# Patient Record
Sex: MALE | Race: WHITE | HISPANIC OR LATINO | Employment: OTHER | URBAN - METROPOLITAN AREA
[De-identification: names, ages, dates, MRNs, and addresses within clinical notes are randomized per-mention and may not be internally consistent; named-entity substitution may affect disease eponyms.]

---

## 2019-11-04 ENCOUNTER — OFFICE VISIT (OUTPATIENT)
Dept: CARDIOLOGY | Facility: CLINIC | Age: 79
End: 2019-11-04
Payer: COMMERCIAL

## 2019-11-04 VITALS
OXYGEN SATURATION: 94 % | SYSTOLIC BLOOD PRESSURE: 107 MMHG | HEART RATE: 76 BPM | HEIGHT: 68 IN | BODY MASS INDEX: 36.89 KG/M2 | WEIGHT: 243.38 LBS | DIASTOLIC BLOOD PRESSURE: 62 MMHG

## 2019-11-04 DIAGNOSIS — I25.10 CORONARY ARTERY DISEASE INVOLVING NATIVE CORONARY ARTERY OF NATIVE HEART WITHOUT ANGINA PECTORIS: Primary | ICD-10-CM

## 2019-11-04 DIAGNOSIS — E78.00 PURE HYPERCHOLESTEROLEMIA: ICD-10-CM

## 2019-11-04 DIAGNOSIS — Z95.1 S/P CABG (CORONARY ARTERY BYPASS GRAFT): ICD-10-CM

## 2019-11-04 DIAGNOSIS — Z95.1 HX OF CABG: ICD-10-CM

## 2019-11-04 DIAGNOSIS — I10 ESSENTIAL HYPERTENSION: ICD-10-CM

## 2019-11-04 DIAGNOSIS — I87.2 VENOUS STASIS DERMATITIS OF BOTH LOWER EXTREMITIES: ICD-10-CM

## 2019-11-04 DIAGNOSIS — R60.0 LOCALIZED EDEMA: ICD-10-CM

## 2019-11-04 DIAGNOSIS — I73.9 PAD (PERIPHERAL ARTERY DISEASE): ICD-10-CM

## 2019-11-04 PROCEDURE — 99999 PR PBB SHADOW E&M-NEW PATIENT-LVL III: CPT | Mod: PBBFAC,,, | Performed by: INTERNAL MEDICINE

## 2019-11-04 PROCEDURE — 93000 EKG 12-LEAD: ICD-10-PCS | Mod: S$GLB,,, | Performed by: INTERNAL MEDICINE

## 2019-11-04 PROCEDURE — 99999 PR PBB SHADOW E&M-NEW PATIENT-LVL III: ICD-10-PCS | Mod: PBBFAC,,, | Performed by: INTERNAL MEDICINE

## 2019-11-04 PROCEDURE — 1101F PR PT FALLS ASSESS DOC 0-1 FALLS W/OUT INJ PAST YR: ICD-10-PCS | Mod: CPTII,S$GLB,, | Performed by: INTERNAL MEDICINE

## 2019-11-04 PROCEDURE — 93000 ELECTROCARDIOGRAM COMPLETE: CPT | Mod: S$GLB,,, | Performed by: INTERNAL MEDICINE

## 2019-11-04 PROCEDURE — 99205 PR OFFICE/OUTPT VISIT, NEW, LEVL V, 60-74 MIN: ICD-10-PCS | Mod: 25,S$GLB,, | Performed by: INTERNAL MEDICINE

## 2019-11-04 PROCEDURE — 1101F PT FALLS ASSESS-DOCD LE1/YR: CPT | Mod: CPTII,S$GLB,, | Performed by: INTERNAL MEDICINE

## 2019-11-04 PROCEDURE — 99205 OFFICE O/P NEW HI 60 MIN: CPT | Mod: 25,S$GLB,, | Performed by: INTERNAL MEDICINE

## 2019-11-04 RX ORDER — LISINOPRIL 40 MG/1
40 TABLET ORAL DAILY
COMMUNITY
Start: 2019-08-21

## 2019-11-04 RX ORDER — HYDROCHLOROTHIAZIDE 12.5 MG/1
12.5 CAPSULE ORAL DAILY
COMMUNITY
Start: 2019-10-07

## 2019-11-04 RX ORDER — ISOSORBIDE MONONITRATE 60 MG/1
60 TABLET, EXTENDED RELEASE ORAL DAILY
COMMUNITY
Start: 2019-08-21

## 2019-11-04 RX ORDER — DULOXETIN HYDROCHLORIDE 30 MG/1
CAPSULE, DELAYED RELEASE ORAL
Refills: 5 | COMMUNITY
Start: 2019-10-24

## 2019-11-04 RX ORDER — TAMSULOSIN HYDROCHLORIDE 0.4 MG/1
0.4 CAPSULE ORAL DAILY
COMMUNITY
Start: 2019-10-31

## 2019-11-04 RX ORDER — ATORVASTATIN CALCIUM 40 MG/1
40 TABLET, FILM COATED ORAL DAILY
COMMUNITY
Start: 2019-09-11

## 2019-11-04 RX ORDER — ASPIRIN 81 MG/1
81 TABLET ORAL DAILY
COMMUNITY

## 2019-11-04 RX ORDER — METOPROLOL TARTRATE 50 MG/1
50 TABLET ORAL 2 TIMES DAILY
COMMUNITY
Start: 2019-08-21

## 2019-11-04 RX ORDER — GABAPENTIN 300 MG/1
300 CAPSULE ORAL 3 TIMES DAILY
COMMUNITY
Start: 2019-08-21

## 2019-11-04 RX ORDER — INSULIN LISPRO 100 [IU]/ML
INJECTION, SOLUTION INTRAVENOUS; SUBCUTANEOUS
COMMUNITY

## 2019-11-04 NOTE — PROGRESS NOTES
Subjective:      Patient ID: Ray Menchaca is a 79 y.o. male.    Chief Complaint: Hyperlipidemia    HPI:  Pt had CABG in [place] in .  At the time of the CABG pt was experiencing chest pressure and shortness of breath.    Pt is not currently experiencing any symptoms of chest pain or shortness of breath.    Pt was [facility] from [facility].    Pt is able to walk very short distances and sometimes pt can mow the lawn.      Both knees hurt when walking.    Review of Systems   Cardiovascular: Positive for claudication and leg swelling (Pt has chronic weeping ulcers and edema and redness of both lower extremities). Negative for chest pain, dyspnea on exertion, irregular heartbeat, near-syncope, orthopnea, palpitations and syncope.      Pt states he underwent attempted PTA of right lower extremity both from the right groin and from the right foot and both times the doctor was unsuccessful.    Past Medical History:   Diagnosis Date    Coronary artery disease     Diabetes mellitus     Hyperlipidemia     Hypertension     Sleep apnea     pt does not wear a CPAP mask        Past Surgical History:   Procedure Laterality Date    cataract surgery      COLECTOMY      CORONARY ARTERY BYPASS GRAFT         History reviewed. No pertinent family history.    Social History     Socioeconomic History    Marital status: Unknown     Spouse name: Not on file    Number of children: Not on file    Years of education: Not on file    Highest education level: Not on file   Occupational History    Not on file   Social Needs    Financial resource strain: Not on file    Food insecurity:     Worry: Not on file     Inability: Not on file    Transportation needs:     Medical: Not on file     Non-medical: Not on file   Tobacco Use    Smoking status: Former Smoker     Types: Cigars     Start date: 2000     Last attempt to quit: 2005     Years since quittin.0    Smokeless tobacco:  "Never Used   Substance and Sexual Activity    Alcohol use: Not Currently    Drug use: Not on file    Sexual activity: Not on file   Lifestyle    Physical activity:     Days per week: Not on file     Minutes per session: Not on file    Stress: Not on file   Relationships    Social connections:     Talks on phone: Not on file     Gets together: Not on file     Attends Shinto service: Not on file     Active member of club or organization: Not on file     Attends meetings of clubs or organizations: Not on file     Relationship status: Not on file   Other Topics Concern    Not on file   Social History Narrative    Not on file       Current Outpatient Medications on File Prior to Visit   Medication Sig Dispense Refill    aspirin (ECOTRIN) 81 MG EC tablet Take 81 mg by mouth once daily.      atorvastatin (LIPITOR) 40 MG tablet Take 40 mg by mouth once daily.       DULoxetine (CYMBALTA) 30 MG capsule TAKE 1 CAPSULE(S) EVERY DAY BY ORAL ROUTE.  5    gabapentin (NEURONTIN) 300 MG capsule 300 mg 3 (three) times daily.       hydroCHLOROthiazide (MICROZIDE) 12.5 mg capsule Take 12.5 mg by mouth once daily.       insulin lispro (HUMALOG KWIKPEN INSULIN) 100 unit/mL pen Inject 13 units 3 times a day by subcutaneous route.      isosorbide mononitrate (IMDUR) 60 MG 24 hr tablet Take 60 mg by mouth once daily.       lisinopril (PRINIVIL,ZESTRIL) 40 MG tablet Take 40 mg by mouth once daily.       metoprolol tartrate (LOPRESSOR) 50 MG tablet Take 50 mg by mouth 2 (two) times daily.       tamsulosin (FLOMAX) 0.4 mg Cap 0.4 mg once daily.        No current facility-administered medications on file prior to visit.        Review of patient's allergies indicates:  No Known Allergies  Objective:     Vitals:    11/04/19 1142   BP: 107/62   BP Location: Right arm   Patient Position: Sitting   BP Method: Large (Automatic)   Pulse: 76   SpO2: (!) 94%   Weight: 110.4 kg (243 lb 6.2 oz)   Height: 5' 8" (1.727 m)    "     Physical Exam   Constitutional: He is oriented to person, place, and time. He appears well-developed and well-nourished. No distress.   Eyes: No scleral icterus.   Neck: No JVD present. Carotid bruit is not present.   Cardiovascular: Regular rhythm and normal heart sounds. Exam reveals no gallop and no friction rub.   No murmur heard.  Pulses:       Dorsalis pedis pulses are 0 on the right side, and 0 on the left side.        Posterior tibial pulses are 0 on the right side, and 0 on the left side.   Pulmonary/Chest: Effort normal and breath sounds normal. No respiratory distress.   Abdominal: Soft. He exhibits no shifting dullness, no distension, no pulsatile liver, no fluid wave, no abdominal bruit, no ascites, no pulsatile midline mass and no mass. There is no hepatosplenomegaly. There is no tenderness.       Musculoskeletal: He exhibits edema (one plus edema bilaterally with stasis dermatitis).   Neurological: He is alert and oriented to person, place, and time.   Skin: Skin is warm and dry. He is not diaphoretic.   Erythema and induration of both lower extremities with minor serous drainage bilaterally     Psychiatric: He has a normal mood and affect. His behavior is normal. Judgment and thought content normal.   Vitals reviewed.     ECG: NSR, leftward axis, incomplete LBBB  Assessment:     1. Coronary artery disease involving native coronary artery of native heart without angina pectoris    2. S/P CABG (coronary artery bypass graft)    3. Pure hypercholesterolemia    4. PAD (peripheral artery disease)    5. Essential hypertension    6. Venous stasis dermatitis of both lower extremities    7. Hx of CABG    8. Localized edema      Plan:   Ray was seen today for hyperlipidemia.    Diagnoses and all orders for this visit:    Coronary artery disease involving native coronary artery of native heart without angina pectoris    S/P CABG (coronary artery bypass graft)  -     IN OFFICE EKG 12-LEAD (to Muse)  -      Echo Color Flow Doppler? Yes; Future    Pure hypercholesterolemia  -     CBC auto differential; Future  -     Comprehensive metabolic panel; Future  -     Hemoglobin A1c; Future  -     Brain natriuretic peptide; Future  -     Lipid panel; Future  -     TSH; Future    PAD (peripheral artery disease)  -     Ambulatory consult to Wound Clinic  -     US Lower Extremity Arteries Bilateral; Future  -     US Lower Extremity Veins Bilateral; Future  -     CBC auto differential; Future  -     Comprehensive metabolic panel; Future  -     Hemoglobin A1c; Future  -     Brain natriuretic peptide; Future  -     Lipid panel; Future  -     TSH; Future    Essential hypertension  -     CBC auto differential; Future  -     Comprehensive metabolic panel; Future  -     Hemoglobin A1c; Future  -     Brain natriuretic peptide; Future  -     Lipid panel; Future  -     TSH; Future    Venous stasis dermatitis of both lower extremities  -     Ambulatory consult to Wound Clinic  -     US Lower Extremity Arteries Bilateral; Future  -     US Lower Extremity Veins Bilateral; Future  -     Echo Color Flow Doppler? Yes; Future    Hx of CABG  -     IN OFFICE EKG 12-LEAD (to Muse)  -     CBC auto differential; Future  -     Comprehensive metabolic panel; Future  -     Hemoglobin A1c; Future  -     Brain natriuretic peptide; Future  -     Lipid panel; Future  -     TSH; Future    Localized edema  -     Echo Color Flow Doppler? Yes; Future  -     CBC auto differential; Future  -     Comprehensive metabolic panel; Future  -     Hemoglobin A1c; Future  -     Brain natriuretic peptide; Future  -     Lipid panel; Future  -     TSH; Future     Low carb diet discussed at length and in detail.    Wt loss encouraged    Tylenol is OK for chronic knee pain    Same meds    Will need records from last cardiologist in [place].  Pt apparently had a stress test in [place], [place] about 4 months ago.Pt had peripheral angiogram in [facility] in [facility] in  2014    Need recent records from [facility] where pt recently had colonoscopy    Wound care consult for stasis dermatitis with weeping ulcers.  Pt may benefit from Unna boots.    Venous ultrasound of lower extremities.    Arterial ultrasound of lower extremities.    Echocardiogram    CXR    Check lab    F/u with Dr Lay Sumner with [facility]    Follow up in about 4 weeks (around 12/2/2019). Pt to f/u with Dr Crain who is fluent in [place] and is expert in lower extremity revascularization    Addendum 11/8/19:  Upon further review the ECG appears to show atrial flutter with 3:1 second degree AV block.  Awaiting labs and f/u visit with  in order to safely begin anticoagulation with Eliquis.

## 2019-11-08 ENCOUNTER — TELEPHONE (OUTPATIENT)
Dept: CARDIOLOGY | Facility: CLINIC | Age: 79
End: 2019-11-08

## 2019-11-11 ENCOUNTER — TELEPHONE (OUTPATIENT)
Dept: WOUND CARE | Facility: HOSPITAL | Age: 79
End: 2019-11-11

## 2019-11-25 ENCOUNTER — HOSPITAL ENCOUNTER (OUTPATIENT)
Dept: RADIOLOGY | Facility: HOSPITAL | Age: 79
Discharge: HOME | End: 2019-11-25
Attending: INTERNAL MEDICINE
Payer: MEDICARE

## 2019-11-25 DIAGNOSIS — I87.2 VENOUS STASIS DERMATITIS OF BOTH LOWER EXTREMITIES: ICD-10-CM

## 2019-11-25 DIAGNOSIS — I73.9 PAD (PERIPHERAL ARTERY DISEASE): ICD-10-CM

## 2019-11-25 PROCEDURE — 93925 LOWER EXTREMITY STUDY: CPT | Mod: 26,,, | Performed by: RADIOLOGY

## 2019-11-25 PROCEDURE — 93970 EXTREMITY STUDY: CPT | Mod: 26,,, | Performed by: RADIOLOGY

## 2019-11-25 PROCEDURE — 93925 LOWER EXTREMITY STUDY: CPT | Mod: TC

## 2019-11-25 PROCEDURE — 93925 US LOWER EXTREMITY ARTERIES BILATERAL: ICD-10-PCS | Mod: 26,,, | Performed by: RADIOLOGY

## 2019-11-25 PROCEDURE — 93970 EXTREMITY STUDY: CPT | Mod: TC

## 2019-11-25 PROCEDURE — 93970 US LOWER EXTREMITY VEINS BILATERAL: ICD-10-PCS | Mod: 26,,, | Performed by: RADIOLOGY

## 2019-12-03 ENCOUNTER — HOSPITAL ENCOUNTER (OUTPATIENT)
Dept: CARDIOLOGY | Facility: HOSPITAL | Age: 79
Discharge: HOME | End: 2019-12-03
Attending: INTERNAL MEDICINE
Payer: MEDICARE

## 2019-12-03 VITALS — WEIGHT: 243 LBS | BODY MASS INDEX: 36.83 KG/M2 | HEIGHT: 68 IN

## 2019-12-03 DIAGNOSIS — R60.0 LOCALIZED EDEMA: ICD-10-CM

## 2019-12-03 DIAGNOSIS — Z95.1 S/P CABG (CORONARY ARTERY BYPASS GRAFT): ICD-10-CM

## 2019-12-03 DIAGNOSIS — I87.2 VENOUS STASIS DERMATITIS OF BOTH LOWER EXTREMITIES: ICD-10-CM

## 2019-12-03 LAB
AORTIC ROOT ANNULUS: 2.45 CM
AORTIC VALVE CUSP SEPERATION: 1.33 CM
ASCENDING AORTA: 2.57 CM
AV INDEX (PROSTH): 0.4
AV MEAN GRADIENT: 9 MMHG
AV PEAK GRADIENT: 15 MMHG
AV VALVE AREA: 1.51 CM2
AV VELOCITY RATIO: 0.52
BSA FOR ECHO PROCEDURE: 2.3 M2
CV ECHO LV RWT: 0.5 CM
DOP CALC AO PEAK VEL: 1.92 M/S
DOP CALC AO VTI: 44.75 CM
DOP CALC LVOT AREA: 3.8 CM2
DOP CALC LVOT DIAMETER: 2.19 CM
DOP CALC LVOT PEAK VEL: 1 M/S
DOP CALC LVOT STROKE VOLUME: 67.66 CM3
DOP CALCLVOT PEAK VEL VTI: 17.97 CM
E WAVE DECELERATION TIME: 148.14 MSEC
E/A RATIO: 2.35
ECHO LV POSTERIOR WALL: 1.3 CM (ref 0.6–1.1)
FRACTIONAL SHORTENING: 30 % (ref 28–44)
INTERVENTRICULAR SEPTUM: 1.3 CM (ref 0.6–1.1)
IVRT: 0.04 MSEC
LA MAJOR: 5.81 CM
LA MINOR: 5.69 CM
LA WIDTH: 3.98 CM
LEFT ATRIUM SIZE: 5 CM
LEFT ATRIUM VOLUME INDEX: 43.8 ML/M2
LEFT ATRIUM VOLUME: 97.25 CM3
LEFT INTERNAL DIMENSION IN SYSTOLE: 3.63 CM (ref 2.1–4)
LEFT VENTRICLE DIASTOLIC VOLUME INDEX: 58.35 ML/M2
LEFT VENTRICLE DIASTOLIC VOLUME: 129.56 ML
LEFT VENTRICLE MASS INDEX: 125 G/M2
LEFT VENTRICLE SYSTOLIC VOLUME INDEX: 25 ML/M2
LEFT VENTRICLE SYSTOLIC VOLUME: 55.48 ML
LEFT VENTRICULAR INTERNAL DIMENSION IN DIASTOLE: 5.2 CM (ref 3.5–6)
LEFT VENTRICULAR MASS: 278.44 G
LV LATERAL E/E' RATIO: 9.77 M/S
MV PEAK A VEL: 0.54 M/S
MV PEAK E VEL: 1.27 M/S
PISA TR MAX VEL: 3.07 M/S
RA MAJOR: 5.27 CM
RA PRESSURE: 8 MMHG
RA WIDTH: 4.65 CM
RIGHT VENTRICULAR END-DIASTOLIC DIMENSION: 2.75 CM
STJ: 2.9 CM
TDI LATERAL: 0.13 M/S
TR MAX PG: 38 MMHG
TV REST PULMONARY ARTERY PRESSURE: 46 MMHG

## 2019-12-03 PROCEDURE — 93306 TTE W/DOPPLER COMPLETE: CPT | Mod: 26,,, | Performed by: INTERNAL MEDICINE

## 2019-12-03 PROCEDURE — 93306 TTE W/DOPPLER COMPLETE: CPT

## 2019-12-03 PROCEDURE — 93306 ECHO (CUPID ONLY): ICD-10-PCS | Mod: 26,,, | Performed by: INTERNAL MEDICINE

## 2019-12-03 PROCEDURE — 63600175 PHARM REV CODE 636 W HCPCS: Performed by: INTERNAL MEDICINE

## 2019-12-03 RX ADMIN — HUMAN ALBUMIN MICROSPHERES AND PERFLUTREN 0.55 MG: 10; .22 INJECTION, SOLUTION INTRAVENOUS at 03:12

## 2019-12-11 ENCOUNTER — HOSPITAL ENCOUNTER (OUTPATIENT)
Dept: WOUND CARE | Facility: HOSPITAL | Age: 79
Discharge: HOME | End: 2019-12-11
Attending: INTERNAL MEDICINE
Payer: MEDICARE

## 2019-12-11 VITALS
BODY MASS INDEX: 36.83 KG/M2 | DIASTOLIC BLOOD PRESSURE: 57 MMHG | HEIGHT: 68 IN | TEMPERATURE: 97 F | HEART RATE: 53 BPM | SYSTOLIC BLOOD PRESSURE: 125 MMHG | WEIGHT: 243 LBS

## 2019-12-11 DIAGNOSIS — I10 ESSENTIAL HYPERTENSION: ICD-10-CM

## 2019-12-11 DIAGNOSIS — I73.9 PAD (PERIPHERAL ARTERY DISEASE): ICD-10-CM

## 2019-12-11 DIAGNOSIS — E78.00 PURE HYPERCHOLESTEROLEMIA: ICD-10-CM

## 2019-12-11 DIAGNOSIS — I87.2 VENOUS STASIS DERMATITIS OF BOTH LOWER EXTREMITIES: Primary | ICD-10-CM

## 2019-12-11 DIAGNOSIS — I25.10 CORONARY ARTERY DISEASE INVOLVING NATIVE CORONARY ARTERY OF NATIVE HEART WITHOUT ANGINA PECTORIS: ICD-10-CM

## 2019-12-11 PROCEDURE — 87077 CULTURE AEROBIC IDENTIFY: CPT

## 2019-12-11 PROCEDURE — 99214 OFFICE O/P EST MOD 30 MIN: CPT

## 2019-12-11 PROCEDURE — 87070 CULTURE OTHR SPECIMN AEROBIC: CPT

## 2019-12-11 PROCEDURE — 87186 SC STD MICRODIL/AGAR DIL: CPT

## 2019-12-11 PROCEDURE — 87075 CULTR BACTERIA EXCEPT BLOOD: CPT

## 2019-12-11 NOTE — PROGRESS NOTES
"Subjective:       Patient ID: Ray Menchaca is a 79 y.o. male.    Chief Complaint: Venous Ulcer    12/11/19/:79 yr old Vincentian speaking  male, admit today to wound care clinic for . Patient has hx of CABG, DM type 2, PAD.  Per patient his current wound to right leg has been present " about a week." He reports "he hits his leg off and on it swells and blisters and takes a while to heal." In January 2019 patient underwent a right leg angiogram in [place], but it was unsuccessful opening up the blockages.  Patient seen  last month early November for his legs in office, he reports labs and ultrasounds were done. Dr. Mariano seen patient, wound culture taken today in clinic, RX given to patient for Keflex 500 mg po, and Plavix 75 mg po daily.  Patient to see Dr. Kathleen in office 12/16/18.  Family requesting home health for dressing change. Orders given to apply hydorfera ready, small abd pad, conform and tubi  F toes to knee. Follow up 1 week 12/18/19 in clinic with .    Review of Systems   Constitutional: Negative.    HENT: Negative.    Eyes: Negative.    Respiratory: Negative.    Cardiovascular: Negative.    Gastrointestinal: Negative.    Genitourinary: Negative.    Musculoskeletal: Negative.    Neurological: Negative.    Psychiatric/Behavioral: Negative.        Objective:      Physical Exam   Constitutional: He is oriented to person, place, and time. He appears well-developed and well-nourished.   HENT:   Head: Normocephalic.   Eyes: Pupils are equal, round, and reactive to light. Conjunctivae and EOM are normal.   Neck: Normal range of motion. Neck supple.   Cardiovascular: Normal rate, regular rhythm, normal heart sounds and intact distal pulses.   Pulmonary/Chest: Effort normal and breath sounds normal.   Abdominal: Soft. Bowel sounds are normal.   Musculoskeletal: Normal range of motion.   Neurological: He is alert and oriented to person, place, and time. He has normal " reflexes.   Skin: Skin is warm and dry.       Assessment:       1. Venous stasis dermatitis of both lower extremities    2. PAD (peripheral artery disease)           Wound 12/11/19 0920 Venous Ulcer lower Leg #1 (Active)   12/11/19 0920    Pre-existing: Yes   Primary Wound Type: Venous ulcer   Side: Right   Orientation: lower   Location: Leg   Wound/PI Number (optional): #1   Ankle-Brachial Index:    Pulses: +Doppler   Removal Indication and Assessment:    Wound Outcome:    (Retired) Wound Type:    (Retired) Wound Length (cm):    (Retired) Wound Width (cm):    (Retired) Depth (cm):    Wound Description (Comments):    Removal Indications:    Wound Image     12/11/2019 10:30 AM   Dressing Appearance No dressing;Open to air 12/11/2019 10:30 AM   Drainage Amount Small 12/11/2019 10:30 AM   Drainage Characteristics/Odor Serosanguineous 12/11/2019 10:30 AM   Appearance Red;Moist;Yellow 12/11/2019 10:30 AM   Tissue loss description Partial thickness 12/11/2019 10:30 AM   Red (%), Wound Tissue Color 80 % 12/11/2019 10:30 AM   Yellow (%), Wound Tissue Color 20 % 12/11/2019 10:30 AM   Periwound Area Edematous;Redness;Hemosiderin Staining;Intact 12/11/2019 10:30 AM   Wound Edges Irregular;Open 12/11/2019 10:30 AM   Wound Length (cm) 7.3 cm 12/11/2019 10:30 AM   Wound Width (cm) 7 cm 12/11/2019 10:30 AM   Wound Depth (cm) 0.1 cm 12/11/2019 10:30 AM   Wound Volume (cm^3) 5.11 cm^3 12/11/2019 10:30 AM   Wound Surface Area (cm^2) 51.1 cm^2 12/11/2019 10:30 AM   Care Cleansed with:;Sterile normal saline 12/11/2019 10:30 AM   Dressing Changed;Applied;Hydrofiber;Abd pad;Rolled gauze;Tubular bandage 12/11/2019 10:30 AM   Periwound Care Absorptive dressing applied;Dry periwound area maintained 12/11/2019 10:30 AM   Compression Tubular elasticized bandage 12/11/2019 10:30 AM   Dressing Change Due 12/13/19 12/11/2019 10:30 AM   Right Lower leg#1  Cleanse wound with:Normal Saline   Lidocaine:PRN  Silver nitrate:NA  Periwound care:Maintain  dry amarilys area  Primary dressing:Hydrofera Ready   Secondary dressing:Small Abd, conform, Tubi  F toe to knee BLE  Edema control: Elevate legs as much as possible  Frequency: Wednesday in clinic, Fridays per Home Health  Follow-up:1 week 12/18/19 in clinic with Dr. Mariano    Home Health: Home Health to admit patient, provide assessment, wound care and dressing changes Fridays and Prn complications.    Other Orders: Right Leg wound culture taken today in clinic.  RX given to patient for Keflex 500 mg Po BID,and Plavix 75 mg PO.   Patient scheduled to see  Dr. Kathleen's office 12/16/19.          Plan:                12/18/19 Dr. Mariano

## 2019-12-12 RX ORDER — CLOPIDOGREL BISULFATE 75 MG/1
TABLET ORAL
Qty: 90 TABLET | Refills: 0 | Status: SHIPPED | OUTPATIENT
Start: 2019-12-12 | End: 2020-03-17

## 2019-12-14 LAB — BACTERIA SPEC AEROBE CULT: ABNORMAL

## 2019-12-16 ENCOUNTER — OFFICE VISIT (OUTPATIENT)
Dept: CARDIOLOGY | Facility: CLINIC | Age: 79
End: 2019-12-16
Payer: MEDICARE

## 2019-12-16 VITALS
HEART RATE: 64 BPM | WEIGHT: 236.69 LBS | SYSTOLIC BLOOD PRESSURE: 128 MMHG | BODY MASS INDEX: 35.98 KG/M2 | OXYGEN SATURATION: 95 % | DIASTOLIC BLOOD PRESSURE: 78 MMHG

## 2019-12-16 DIAGNOSIS — I10 ESSENTIAL HYPERTENSION: ICD-10-CM

## 2019-12-16 DIAGNOSIS — I87.2 VENOUS STASIS DERMATITIS OF BOTH LOWER EXTREMITIES: Primary | ICD-10-CM

## 2019-12-16 DIAGNOSIS — I87.2 VENOUS INSUFFICIENCY OF BOTH LOWER EXTREMITIES: ICD-10-CM

## 2019-12-16 DIAGNOSIS — I25.10 CORONARY ARTERY DISEASE INVOLVING NATIVE CORONARY ARTERY OF NATIVE HEART WITHOUT ANGINA PECTORIS: ICD-10-CM

## 2019-12-16 DIAGNOSIS — I73.9 PAD (PERIPHERAL ARTERY DISEASE): ICD-10-CM

## 2019-12-16 DIAGNOSIS — Z95.1 S/P CABG (CORONARY ARTERY BYPASS GRAFT): ICD-10-CM

## 2019-12-16 DIAGNOSIS — E78.00 PURE HYPERCHOLESTEROLEMIA: ICD-10-CM

## 2019-12-16 LAB — BACTERIA SPEC ANAEROBE CULT: NORMAL

## 2019-12-16 PROCEDURE — 99205 OFFICE O/P NEW HI 60 MIN: CPT | Mod: S$GLB,,, | Performed by: INTERNAL MEDICINE

## 2019-12-16 PROCEDURE — 1159F PR MEDICATION LIST DOCUMENTED IN MEDICAL RECORD: ICD-10-PCS | Mod: S$GLB,,, | Performed by: INTERNAL MEDICINE

## 2019-12-16 PROCEDURE — 99205 PR OFFICE/OUTPT VISIT, NEW, LEVL V, 60-74 MIN: ICD-10-PCS | Mod: S$GLB,,, | Performed by: INTERNAL MEDICINE

## 2019-12-16 PROCEDURE — 1159F MED LIST DOCD IN RCRD: CPT | Mod: S$GLB,,, | Performed by: INTERNAL MEDICINE

## 2019-12-16 PROCEDURE — 1125F AMNT PAIN NOTED PAIN PRSNT: CPT | Mod: S$GLB,,, | Performed by: INTERNAL MEDICINE

## 2019-12-16 PROCEDURE — 1101F PT FALLS ASSESS-DOCD LE1/YR: CPT | Mod: CPTII,S$GLB,, | Performed by: INTERNAL MEDICINE

## 2019-12-16 PROCEDURE — 99999 PR PBB SHADOW E&M-EST. PATIENT-LVL III: CPT | Mod: PBBFAC,,, | Performed by: INTERNAL MEDICINE

## 2019-12-16 PROCEDURE — 99999 PR PBB SHADOW E&M-EST. PATIENT-LVL III: ICD-10-PCS | Mod: PBBFAC,,, | Performed by: INTERNAL MEDICINE

## 2019-12-16 PROCEDURE — 1125F PR PAIN SEVERITY QUANTIFIED, PAIN PRESENT: ICD-10-PCS | Mod: S$GLB,,, | Performed by: INTERNAL MEDICINE

## 2019-12-16 PROCEDURE — 1101F PR PT FALLS ASSESS DOC 0-1 FALLS W/OUT INJ PAST YR: ICD-10-PCS | Mod: CPTII,S$GLB,, | Performed by: INTERNAL MEDICINE

## 2019-12-16 RX ORDER — CEPHALEXIN 500 MG/1
500 CAPSULE ORAL 2 TIMES DAILY
COMMUNITY

## 2019-12-16 NOTE — H&P (VIEW-ONLY)
Subjective:   Patient ID:  Ray Menchaca is a 79 y.o. male who presents for evaluation and treatment of Chronic Venous Insufficiency w/ Ulceration; Hyperlipidemia; Hypertension; and Coronary Artery Disease      HPI:     He is here for management of venous insufficiency complicated with R shin ulceration, hyperpigmentation, edema, and venous claudication. Wound started since . Recent R shin ulcer started 2 months ago. He is currently in wound care      Arterial US 2019     Diameters in mm    CFA 10    SFA 8    POP 7    BTK 4    BTA 3          R , pSFA 160, mSFA 82, dSFA 100, POP 95, PT 22-27-64, AT 16-22, DP 8  L , pSFA 145, mSFA 87, dSFA 114, -170, -74, AT 46-48, DP 22      Venous US 2019     No DVT                 Patient Active Problem List    Diagnosis Date Noted    DM (diabetes mellitus) 2019    Coronary artery disease involving native coronary artery of native heart without angina pectoris 2019    S/P CABG (coronary artery bypass graft) 2019    Pure hypercholesterolemia 2019    PAD (peripheral artery disease) 2019    Essential hypertension 2019    Venous stasis dermatitis of both lower extremities 2019           Right Arm BP - Sittin/78  Left Arm BP - Sittin/78        LABS      LAST HbA1c  Lab Results   Component Value Date    HGBA1C 5.9 (H) 2019       Lipid panel  Lab Results   Component Value Date    CHOL 120 2019     Lab Results   Component Value Date    HDL 43 2019     Lab Results   Component Value Date    LDLCALC 61.4 (L) 2019     Lab Results   Component Value Date    TRIG 78 2019     Lab Results   Component Value Date    CHOLHDL 35.8 2019            ROS    Objective:   Physical Exam   Constitutional: He is oriented to person, place, and time. He appears well-developed and well-nourished. He is not intubated.   HENT:   Head: Normocephalic and atraumatic.   Right Ear:  External ear normal.   Left Ear: External ear normal.   Mouth/Throat: Oropharynx is clear and moist.   Eyes: Pupils are equal, round, and reactive to light. Conjunctivae and EOM are normal. Right eye exhibits no discharge. Left eye exhibits no discharge. No scleral icterus.   Neck: Normal range of motion. Neck supple. Normal carotid pulses, no hepatojugular reflux and no JVD present. Carotid bruit is not present. No tracheal deviation present. No thyromegaly present.   Cardiovascular: Normal rate, regular rhythm, S1 normal and S2 normal.  No extrasystoles are present. PMI is not displaced. Exam reveals no gallop, no S3, no distant heart sounds, no friction rub and no midsystolic click.   No murmur heard.  Pulses:       Carotid pulses are 2+ on the right side, and 2+ on the left side.       Radial pulses are 2+ on the right side, and 2+ on the left side.        Femoral pulses are 2+ on the right side, and 2+ on the left side.       Popliteal pulses are 2+ on the right side, and 2+ on the left side.        Dorsalis pedis pulses are 0 on the right side, and 0 on the left side.        Posterior tibial pulses are 0 on the right side, and 0 on the left side.       Biphasic bilateral DP doppler signals       Triphasic bilateral TP doppler signals          Pulmonary/Chest: Effort normal and breath sounds normal. No accessory muscle usage or stridor. No apnea, no tachypnea and no bradypnea. He is not intubated. No respiratory distress. He has no decreased breath sounds. He has no wheezes. He has no rales. He exhibits no tenderness and no bony tenderness.   Abdominal: He exhibits no distension, no pulsatile liver, no abdominal bruit, no ascites, no pulsatile midline mass and no mass. There is no tenderness. There is no rebound and no guarding.   Musculoskeletal: Normal range of motion. He exhibits no edema or tenderness.   Lymphadenopathy:     He has no cervical adenopathy.   Neurological: He is alert and oriented to person,  place, and time. He has normal reflexes. No cranial nerve deficit. Coordination normal.   Skin: Skin is warm. No rash noted. No erythema. No pallor.       Large superficial wound R shin       Hyperpigmentation-both ankles      See images                Psychiatric: He has a normal mood and affect. His behavior is normal. Judgment and thought content normal.       12/16/2019                Assessment:     1. Venous stasis dermatitis of both lower extremities    2. Essential hypertension    3. PAD (peripheral artery disease)    4. Pure hypercholesterolemia    5. Coronary artery disease involving native coronary artery of native heart without angina pectoris    6. S/P CABG (coronary artery bypass graft)    7. Venous insufficiency of both lower extremities        Plan:       Obtain a venous ultrasound to assess for reflux disease  If he has superficial reflux he will proceed with endovenous intervention      Establish general cardiology care with Dr. Crain             Continue with current medical plan and lifestyle changes.  Return sooner for concerns or questions. If symptoms persist go to the ED  I have reviewed all pertinent data on this patient       I have reviewed the patient's medical history in detail and updated the computerized patient record.    Orders Placed This Encounter   Procedures    US Lower Extremity Veins Bilateral Insuf     Standing Status:   Future     Standing Expiration Date:   12/15/2020     Order Specific Question:   May the Radiologist modify the order per protocol to meet the clinical needs of the patient?     Answer:   Yes    Case Request-Cath Lab: Ablation, Vein, Peripheral, Percutaneous, Endovenous, Using Laser     Standing Status:   Standing     Number of Occurrences:   1     Order Specific Question:   CPT Code:     Answer:   [place] ENDOVENOUS LASER, 1ST VEIN [13177]     Order Specific Question:   Medical Necessity:     Answer:   Medically Urgent [101]       Follow up as scheduled.  Return sooner for concerns or questions            He expressed verbal understanding and agreed with the plan        Patient's Medications   New Prescriptions    No medications on file   Previous Medications    ASPIRIN (ECOTRIN) 81 MG EC TABLET    Take 81 mg by mouth once daily.    ATORVASTATIN (LIPITOR) 40 MG TABLET    Take 40 mg by mouth once daily.     CEPHALEXIN (KEFLEX) 500 MG CAPSULE    Take 500 mg by mouth 2 (two) times daily.    CLOPIDOGREL (PLAVIX) 75 MG TABLET    TAKE 1 TABLET BY MOUTH EVERY DAY    DULOXETINE (CYMBALTA) 30 MG CAPSULE    TAKE 1 CAPSULE(S) EVERY DAY BY ORAL ROUTE.    GABAPENTIN (NEURONTIN) 300 MG CAPSULE    300 mg 3 (three) times daily.     HYDROCHLOROTHIAZIDE (MICROZIDE) 12.5 MG CAPSULE    Take 12.5 mg by mouth once daily.     INSULIN LISPRO (HUMALOG KWIKPEN INSULIN) 100 UNIT/ML PEN    Inject 13 units 3 times a day by subcutaneous route.    ISOSORBIDE MONONITRATE (IMDUR) 60 MG 24 HR TABLET    Take 60 mg by mouth once daily.     LISINOPRIL (PRINIVIL,ZESTRIL) 40 MG TABLET    Take 40 mg by mouth once daily.     METOPROLOL TARTRATE (LOPRESSOR) 50 MG TABLET    Take 50 mg by mouth 2 (two) times daily.     TAMSULOSIN (FLOMAX) 0.4 MG CAP    0.4 mg once daily.    Modified Medications    No medications on file   Discontinued Medications    No medications on file

## 2019-12-16 NOTE — PROGRESS NOTES
Subjective:   Patient ID:  Ray Menchaca is a 79 y.o. male who presents for evaluation and treatment of Chronic Venous Insufficiency w/ Ulceration; Hyperlipidemia; Hypertension; and Coronary Artery Disease      HPI:     He is here for management of venous insufficiency complicated with R shin ulceration, hyperpigmentation, edema, and venous claudication. Wound started since . Recent R shin ulcer started 2 months ago. He is currently in wound care      Arterial US 2019     Diameters in mm    CFA 10    SFA 8    POP 7    BTK 4    BTA 3          R , pSFA 160, mSFA 82, dSFA 100, POP 95, PT 22-27-64, AT 16-22, DP 8  L , pSFA 145, mSFA 87, dSFA 114, -170, -74, AT 46-48, DP 22      Venous US 2019     No DVT                 Patient Active Problem List    Diagnosis Date Noted    DM (diabetes mellitus) 2019    Coronary artery disease involving native coronary artery of native heart without angina pectoris 2019    S/P CABG (coronary artery bypass graft) 2019    Pure hypercholesterolemia 2019    PAD (peripheral artery disease) 2019    Essential hypertension 2019    Venous stasis dermatitis of both lower extremities 2019           Right Arm BP - Sittin/78  Left Arm BP - Sittin/78        LABS      LAST HbA1c  Lab Results   Component Value Date    HGBA1C 5.9 (H) 2019       Lipid panel  Lab Results   Component Value Date    CHOL 120 2019     Lab Results   Component Value Date    HDL 43 2019     Lab Results   Component Value Date    LDLCALC 61.4 (L) 2019     Lab Results   Component Value Date    TRIG 78 2019     Lab Results   Component Value Date    CHOLHDL 35.8 2019            ROS    Objective:   Physical Exam   Constitutional: He is oriented to person, place, and time. He appears well-developed and well-nourished. He is not intubated.   HENT:   Head: Normocephalic and atraumatic.   Right Ear:  External ear normal.   Left Ear: External ear normal.   Mouth/Throat: Oropharynx is clear and moist.   Eyes: Pupils are equal, round, and reactive to light. Conjunctivae and EOM are normal. Right eye exhibits no discharge. Left eye exhibits no discharge. No scleral icterus.   Neck: Normal range of motion. Neck supple. Normal carotid pulses, no hepatojugular reflux and no JVD present. Carotid bruit is not present. No tracheal deviation present. No thyromegaly present.   Cardiovascular: Normal rate, regular rhythm, S1 normal and S2 normal.  No extrasystoles are present. PMI is not displaced. Exam reveals no gallop, no S3, no distant heart sounds, no friction rub and no midsystolic click.   No murmur heard.  Pulses:       Carotid pulses are 2+ on the right side, and 2+ on the left side.       Radial pulses are 2+ on the right side, and 2+ on the left side.        Femoral pulses are 2+ on the right side, and 2+ on the left side.       Popliteal pulses are 2+ on the right side, and 2+ on the left side.        Dorsalis pedis pulses are 0 on the right side, and 0 on the left side.        Posterior tibial pulses are 0 on the right side, and 0 on the left side.       Biphasic bilateral DP doppler signals       Triphasic bilateral TP doppler signals          Pulmonary/Chest: Effort normal and breath sounds normal. No accessory muscle usage or stridor. No apnea, no tachypnea and no bradypnea. He is not intubated. No respiratory distress. He has no decreased breath sounds. He has no wheezes. He has no rales. He exhibits no tenderness and no bony tenderness.   Abdominal: He exhibits no distension, no pulsatile liver, no abdominal bruit, no ascites, no pulsatile midline mass and no mass. There is no tenderness. There is no rebound and no guarding.   Musculoskeletal: Normal range of motion. He exhibits no edema or tenderness.   Lymphadenopathy:     He has no cervical adenopathy.   Neurological: He is alert and oriented to person,  place, and time. He has normal reflexes. No cranial nerve deficit. Coordination normal.   Skin: Skin is warm. No rash noted. No erythema. No pallor.       Large superficial wound R shin       Hyperpigmentation-both ankles      See images                Psychiatric: He has a normal mood and affect. His behavior is normal. Judgment and thought content normal.       12/16/2019                Assessment:     1. Venous stasis dermatitis of both lower extremities    2. Essential hypertension    3. PAD (peripheral artery disease)    4. Pure hypercholesterolemia    5. Coronary artery disease involving native coronary artery of native heart without angina pectoris    6. S/P CABG (coronary artery bypass graft)    7. Venous insufficiency of both lower extremities        Plan:       Obtain a venous ultrasound to assess for reflux disease  If he has superficial reflux he will proceed with endovenous intervention      Establish general cardiology care with Dr. Crain             Continue with current medical plan and lifestyle changes.  Return sooner for concerns or questions. If symptoms persist go to the ED  I have reviewed all pertinent data on this patient       I have reviewed the patient's medical history in detail and updated the computerized patient record.    Orders Placed This Encounter   Procedures    US Lower Extremity Veins Bilateral Insuf     Standing Status:   Future     Standing Expiration Date:   12/15/2020     Order Specific Question:   May the Radiologist modify the order per protocol to meet the clinical needs of the patient?     Answer:   Yes    Case Request-Cath Lab: Ablation, Vein, Peripheral, Percutaneous, Endovenous, Using Laser     Standing Status:   Standing     Number of Occurrences:   1     Order Specific Question:   CPT Code:     Answer:   UT ENDOVENOUS LASER, 1ST VEIN [02000]     Order Specific Question:   Medical Necessity:     Answer:   Medically Urgent [101]       Follow up as scheduled.  Return sooner for concerns or questions            He expressed verbal understanding and agreed with the plan        Patient's Medications   New Prescriptions    No medications on file   Previous Medications    ASPIRIN (ECOTRIN) 81 MG EC TABLET    Take 81 mg by mouth once daily.    ATORVASTATIN (LIPITOR) 40 MG TABLET    Take 40 mg by mouth once daily.     CEPHALEXIN (KEFLEX) 500 MG CAPSULE    Take 500 mg by mouth 2 (two) times daily.    CLOPIDOGREL (PLAVIX) 75 MG TABLET    TAKE 1 TABLET BY MOUTH EVERY DAY    DULOXETINE (CYMBALTA) 30 MG CAPSULE    TAKE 1 CAPSULE(S) EVERY DAY BY ORAL ROUTE.    GABAPENTIN (NEURONTIN) 300 MG CAPSULE    300 mg 3 (three) times daily.     HYDROCHLOROTHIAZIDE (MICROZIDE) 12.5 MG CAPSULE    Take 12.5 mg by mouth once daily.     INSULIN LISPRO (HUMALOG KWIKPEN INSULIN) 100 UNIT/ML PEN    Inject 13 units 3 times a day by subcutaneous route.    ISOSORBIDE MONONITRATE (IMDUR) 60 MG 24 HR TABLET    Take 60 mg by mouth once daily.     LISINOPRIL (PRINIVIL,ZESTRIL) 40 MG TABLET    Take 40 mg by mouth once daily.     METOPROLOL TARTRATE (LOPRESSOR) 50 MG TABLET    Take 50 mg by mouth 2 (two) times daily.     TAMSULOSIN (FLOMAX) 0.4 MG CAP    0.4 mg once daily.    Modified Medications    No medications on file   Discontinued Medications    No medications on file

## 2019-12-17 ENCOUNTER — OFFICE VISIT (OUTPATIENT)
Dept: CARDIOLOGY | Facility: CLINIC | Age: 79
End: 2019-12-17
Payer: MEDICARE

## 2019-12-17 VITALS
BODY MASS INDEX: 35.92 KG/M2 | HEART RATE: 86 BPM | OXYGEN SATURATION: 91 % | DIASTOLIC BLOOD PRESSURE: 82 MMHG | WEIGHT: 237 LBS | SYSTOLIC BLOOD PRESSURE: 146 MMHG | HEIGHT: 68 IN

## 2019-12-17 DIAGNOSIS — E11.9 TYPE 2 DIABETES MELLITUS WITHOUT COMPLICATION, WITH LONG-TERM CURRENT USE OF INSULIN: ICD-10-CM

## 2019-12-17 DIAGNOSIS — Z95.1 S/P CABG (CORONARY ARTERY BYPASS GRAFT): ICD-10-CM

## 2019-12-17 DIAGNOSIS — I25.10 CORONARY ARTERY DISEASE INVOLVING NATIVE CORONARY ARTERY OF NATIVE HEART WITHOUT ANGINA PECTORIS: Primary | ICD-10-CM

## 2019-12-17 DIAGNOSIS — I73.9 PAD (PERIPHERAL ARTERY DISEASE): ICD-10-CM

## 2019-12-17 DIAGNOSIS — I10 ESSENTIAL HYPERTENSION: ICD-10-CM

## 2019-12-17 DIAGNOSIS — I87.2 VENOUS STASIS DERMATITIS OF BOTH LOWER EXTREMITIES: ICD-10-CM

## 2019-12-17 DIAGNOSIS — E78.00 PURE HYPERCHOLESTEROLEMIA: ICD-10-CM

## 2019-12-17 DIAGNOSIS — Z79.4 TYPE 2 DIABETES MELLITUS WITHOUT COMPLICATION, WITH LONG-TERM CURRENT USE OF INSULIN: ICD-10-CM

## 2019-12-17 PROCEDURE — 1126F AMNT PAIN NOTED NONE PRSNT: CPT | Mod: S$GLB,,, | Performed by: INTERNAL MEDICINE

## 2019-12-17 PROCEDURE — 93000 EKG 12-LEAD: ICD-10-PCS | Mod: S$GLB,,, | Performed by: STUDENT IN AN ORGANIZED HEALTH CARE EDUCATION/TRAINING PROGRAM

## 2019-12-17 PROCEDURE — 93000 ELECTROCARDIOGRAM COMPLETE: CPT | Mod: S$GLB,,, | Performed by: STUDENT IN AN ORGANIZED HEALTH CARE EDUCATION/TRAINING PROGRAM

## 2019-12-17 PROCEDURE — 1159F MED LIST DOCD IN RCRD: CPT | Mod: S$GLB,,, | Performed by: INTERNAL MEDICINE

## 2019-12-17 PROCEDURE — 99214 OFFICE O/P EST MOD 30 MIN: CPT | Mod: S$GLB,,, | Performed by: INTERNAL MEDICINE

## 2019-12-17 PROCEDURE — 99999 PR PBB SHADOW E&M-EST. PATIENT-LVL III: ICD-10-PCS | Mod: PBBFAC,,, | Performed by: INTERNAL MEDICINE

## 2019-12-17 PROCEDURE — 99999 PR PBB SHADOW E&M-EST. PATIENT-LVL III: CPT | Mod: PBBFAC,,, | Performed by: INTERNAL MEDICINE

## 2019-12-17 PROCEDURE — 1101F PR PT FALLS ASSESS DOC 0-1 FALLS W/OUT INJ PAST YR: ICD-10-PCS | Mod: CPTII,S$GLB,, | Performed by: INTERNAL MEDICINE

## 2019-12-17 PROCEDURE — 1159F PR MEDICATION LIST DOCUMENTED IN MEDICAL RECORD: ICD-10-PCS | Mod: S$GLB,,, | Performed by: INTERNAL MEDICINE

## 2019-12-17 PROCEDURE — 99214 PR OFFICE/OUTPT VISIT, EST, LEVL IV, 30-39 MIN: ICD-10-PCS | Mod: S$GLB,,, | Performed by: INTERNAL MEDICINE

## 2019-12-17 PROCEDURE — 1126F PR PAIN SEVERITY QUANTIFIED, NO PAIN PRESENT: ICD-10-PCS | Mod: S$GLB,,, | Performed by: INTERNAL MEDICINE

## 2019-12-17 PROCEDURE — 1101F PT FALLS ASSESS-DOCD LE1/YR: CPT | Mod: CPTII,S$GLB,, | Performed by: INTERNAL MEDICINE

## 2019-12-17 NOTE — PROGRESS NOTES
Subjective:    Patient ID:  Ray Menchaca is a 79 y.o. male who presents for evaluation of Coronary Artery Disease      HPI    80 y/o Chinese speaking male with hx of CAD s/p 3V CABG 2005, HTN, HLD, DM, PAD, venous stasis dermatitis, who presents to establish care. Recently seen by Dr Zimmerman and Dr Kathleen. Denies CP, SOB/REYNAGA, orthopnea, PND, syncope, palps. Has chronic bilateral LE edema and wounds now on anterior shin. Recently seen by Wound Care clinic. Has 2DE with normal EF, DD, PHTN. Has arterial doppler with patent flow and elevated velocities BTK. Has venous doppler without DVT.     Review of Systems   Constitution: Negative for malaise/fatigue.   HENT: Negative for congestion.    Eyes: Negative for blurred vision.   Cardiovascular: Positive for leg swelling. Negative for chest pain, claudication, cyanosis, dyspnea on exertion, irregular heartbeat, near-syncope, orthopnea, palpitations, paroxysmal nocturnal dyspnea and syncope.   Respiratory: Negative for shortness of breath.    Endocrine: Negative for polyuria.   Hematologic/Lymphatic: Negative for bleeding problem.   Skin: Positive for poor wound healing. Negative for itching and rash.   Musculoskeletal: Negative for joint swelling, muscle cramps and muscle weakness.   Gastrointestinal: Negative for abdominal pain, hematemesis, hematochezia, melena, nausea and vomiting.   Genitourinary: Negative for dysuria and hematuria.   Neurological: Negative for dizziness, focal weakness, headaches, light-headedness, loss of balance and weakness.   Psychiatric/Behavioral: Negative for depression. The patient is not nervous/anxious.         Objective:    Physical Exam   Constitutional: He is oriented to person, place, and time. He appears well-developed and well-nourished.   HENT:   Head: Normocephalic and atraumatic.   Neck: Neck supple. No JVD present.   Cardiovascular: Normal rate, regular rhythm and normal heart sounds.   Pulses:       Carotid pulses are 2+  on the right side, and 2+ on the left side.       Radial pulses are 2+ on the right side, and 2+ on the left side.        Femoral pulses are 2+ on the right side, and 2+ on the left side.       Dorsalis pedis pulses are 2+ on the right side, and 2+ on the left side.        Posterior tibial pulses are 2+ on the right side, and 2+ on the left side.   Pulmonary/Chest: Effort normal and breath sounds normal.   Abdominal: Soft. Bowel sounds are normal.   Musculoskeletal: He exhibits edema.   Legs wrapped   Neurological: He is alert and oriented to person, place, and time.   Skin: Skin is warm and dry.   Psychiatric: He has a normal mood and affect. His behavior is normal. Thought content normal.         Assessment:       1. Coronary artery disease involving native coronary artery of native heart without angina pectoris    2. S/P CABG (coronary artery bypass graft)    3. Essential hypertension    4. Pure hypercholesterolemia    5. PAD (peripheral artery disease)    6. Venous stasis dermatitis of both lower extremities    7. Type 2 diabetes mellitus without complication, with long-term current use of insulin      80 y/o pt with hx and presentation as above. Doing well from a cardiac perspective and compensated from a HF perspective. Studies reviewed along with medication list and updated/appropriate. ECG in clinic appears to be SR, however, may be 3:1 flutter. Holter monitor and will discuss with EP. Discussed the etiology, evaluation, and management of CAD, CABG, HTN, HLD, PAD, DM. Discussed the importance of med compliance, heart healthy diet, and regular exercise.      Plan:       -Continue current medical management  -Holter x 24 hours  -f/u in 3 months

## 2019-12-18 ENCOUNTER — TELEPHONE (OUTPATIENT)
Dept: CARDIOLOGY | Facility: CLINIC | Age: 79
End: 2019-12-18

## 2019-12-18 ENCOUNTER — HOSPITAL ENCOUNTER (OUTPATIENT)
Dept: WOUND CARE | Facility: HOSPITAL | Age: 79
Discharge: HOME | End: 2019-12-18
Attending: SURGERY
Payer: MEDICARE

## 2019-12-18 VITALS
TEMPERATURE: 98 F | BODY MASS INDEX: 37.04 KG/M2 | HEART RATE: 70 BPM | HEIGHT: 67 IN | SYSTOLIC BLOOD PRESSURE: 148 MMHG | DIASTOLIC BLOOD PRESSURE: 70 MMHG | WEIGHT: 236 LBS

## 2019-12-18 DIAGNOSIS — I10 ESSENTIAL HYPERTENSION: ICD-10-CM

## 2019-12-18 DIAGNOSIS — Z79.4 DIABETES MELLITUS DUE TO UNDERLYING CONDITION WITH HYPEROSMOLARITY AND COMA, WITH LONG-TERM CURRENT USE OF INSULIN: ICD-10-CM

## 2019-12-18 DIAGNOSIS — I73.9 PAD (PERIPHERAL ARTERY DISEASE): Primary | ICD-10-CM

## 2019-12-18 DIAGNOSIS — E08.01 DIABETES MELLITUS DUE TO UNDERLYING CONDITION WITH HYPEROSMOLARITY AND COMA, WITH LONG-TERM CURRENT USE OF INSULIN: ICD-10-CM

## 2019-12-18 DIAGNOSIS — I87.2 VENOUS STASIS DERMATITIS OF BOTH LOWER EXTREMITIES: ICD-10-CM

## 2019-12-18 PROCEDURE — 99214 OFFICE O/P EST MOD 30 MIN: CPT

## 2019-12-18 NOTE — TELEPHONE ENCOUNTER
Reached out to pt and advised pt wife about scheduled holter and US     Pamela slips also mailed out

## 2019-12-18 NOTE — PATIENT INSTRUCTIONS
Chronic Venous Insufficiency: Treating Ulcers    If leg swelling because of chronic venous insufficiency isnt controlled, an open wound (ulcer) can form. Although ulcers vary in size and shape, they usually appear on the inside of the ankle.  Treating an ulcer  · Visit your healthcare provider. Ulcers need frequent medical care. Special dressings may be applied. You may be given antibiotics to fight infection.  · Your healthcare provider may prescribe medicines, such as aspirin or pentoxifylline, to help the ulcer heal.  · Your healthcare provider may prescribe compression hose to help with the swelling.   · Elevate your legs often to reduce swelling. The ulcer needs oxygen-rich blood to heal. This blood cant reach the ulcer until swelling is reduced.  When to call your healthcare provider  Seek immediate medical attention if:   · You have an increase in pain  · You develop a fever of 100.4°F (38°C) or higher, or as directed by your healthcare provider  · The area around the ulcer becomes red, tender, or both  · The ulcer oozes discolored fluid or smells bad  · Swelling increases suddenly or the dressing feels tight   Date Last Reviewed: 5/1/2016  © 0568-5437 [employer]. [address] [place] [id number]. All rights reserved. This information is not intended as a substitute for professional medical care. Always follow your healthcare professional's instructions.

## 2019-12-18 NOTE — PROGRESS NOTES
"Subjective:       Patient ID: Ray Menchaca is a 79 y.o. male.    Chief Complaint: Venous Stasis and Non-healing Wound Follow Up    12/11/19/:79 yr old Belarusian speaking  male, admit today to wound care clinic for . Patient has hx of CABG, DM type 2, PAD.  Per patient his current wound to right leg has been present " about a week." He reports "he hits his leg off and on it swells and blisters and takes a while to heal." In January 2019 patient underwent a right leg angiogram in [place], but it was unsuccessful opening up the blockages.  Patient seen  last month early November for his legs in office, he reports labs and ultrasounds were done. Dr. Mariano seen patient, wound culture taken today in clinic, RX given to patient for Keflex 500 mg po, and Plavix 75 mg po daily.  Patient to see Dr. Kathleen in office 12/16/18.  Family requesting home health for dressing change. Orders given to apply hydorfera ready, small abd pad, conform and tubi  F toes to knee. Follow up 1 week 12/18/19 in clinic with . Haleigh in Cardiology interpreted today's visit.   12/18/19: Haleigh in cardiology interpreted at visit today.  Seen today in clinic by Dr. Mariano. Wound improving. Patient complains of hydrofera ready sticking to wound.  Patient states home health came out last Thursday, but he can not remember the name of the company for orders to be sent to, copy of new orders given to patient for home health.  Patient also reports he had an appointment with Dr. Kathleen Monday 12/16/18, and  ordered another Ultrasound of BLE to check the blood flow.  Number for scheduling was given to patient.  Orders changed to gentamicin and adaptic to wound, small abd pad, conform and tubi .  Follow up in 3 weeks in clinic with .    Review of Systems   Constitutional: Negative.    HENT: Negative.    Eyes: Negative.    Respiratory: Negative.    Cardiovascular: Negative.    Gastrointestinal: " Negative.    Genitourinary: Negative.    Musculoskeletal: Negative.    Neurological: Negative.    Psychiatric/Behavioral: Negative.        Objective:      Physical Exam   Constitutional: He is oriented to person, place, and time. He appears well-developed and well-nourished.   HENT:   Head: Normocephalic.   Eyes: Pupils are equal, round, and reactive to light. Conjunctivae and EOM are normal.   Neck: Normal range of motion. Neck supple.   Cardiovascular: Normal rate, regular rhythm, normal heart sounds and intact distal pulses.   Pulmonary/Chest: Effort normal and breath sounds normal.   Abdominal: Soft. Bowel sounds are normal.   Musculoskeletal: Normal range of motion.   Neurological: He is alert and oriented to person, place, and time. He has normal reflexes.   Skin: Skin is warm and dry.       Assessment:       1. PAD (peripheral artery disease)    2. Venous stasis dermatitis of both lower extremities    3. Diabetes mellitus due to underlying condition with hyperosmolarity and coma, with long-term current use of insulin           Wound 12/11/19 0920 Venous Ulcer lower Leg #1 (Active)   12/11/19 0920    Pre-existing: Yes   Primary Wound Type: Venous ulcer   Side: Right   Orientation: lower   Location: Leg   Wound/PI Number (optional): #1   Ankle-Brachial Index:    Pulses: +Doppler   Removal Indication and Assessment:    Wound Outcome:    (Retired) Wound Type:    (Retired) Wound Length (cm):    (Retired) Wound Width (cm):    (Retired) Depth (cm):    Wound Description (Comments):    Removal Indications:    Wound Image     12/18/2019  3:39 PM   Dressing Appearance Intact 12/18/2019  3:39 PM   Drainage Amount Scant 12/18/2019  3:39 PM   Drainage Characteristics/Odor Serosanguineous 12/18/2019  3:39 PM   Appearance Red;Granulating;Moist;Epithelialization 12/18/2019  3:39 PM   Tissue loss description Partial thickness 12/18/2019  3:39 PM   Red (%), Wound Tissue Color 100 % 12/18/2019  3:39 PM   Periwound Area  Edematous;Hemosiderin Staining;Intact 12/18/2019  3:39 PM   Wound Edges Irregular;Open 12/18/2019  3:39 PM   Wound Length (cm) 6.1 cm 12/18/2019  3:39 PM   Wound Width (cm) 2.5 cm 12/18/2019  3:39 PM   Wound Depth (cm) 0.1 cm 12/18/2019  3:39 PM   Wound Volume (cm^3) 1.52 cm^3 12/18/2019  3:39 PM   Wound Surface Area (cm^2) 15.25 cm^2 12/18/2019  3:39 PM   Care Cleansed with:;Sterile normal saline 12/18/2019  3:39 PM   Dressing Changed;Applied;Abd pad;Rolled gauze;Tubular bandage;Non-adherent 12/18/2019  3:39 PM   Periwound Care Absorptive dressing applied;Dry periwound area maintained 12/18/2019  3:39 PM   Compression Tubular elasticized bandage 12/18/2019  3:39 PM   Dressing Change Due 12/19/19 12/18/2019  3:39 PM         Right Lower leg#1  Cleanse wound with:Normal Saline   Lidocaine:PRN  Silver nitrate:NA  Periwound care:Maintain dry amarilys area  Primary dressing:Gentamincin, Adaptic  Secondary dressing:Small Abd, conform, Tubi  F toe to knee BLE  Edema control: Elevate legs as much as possible  Frequency: Wednesday in clinic, weekly per Home Health  Follow-up:3 weeks 1/8/20 in clinic with Dr. Mariano    Home Health: Home Health to admit patient, provide assessment, wound care and dressing changes weekly and Prn complications.    Other Orders:  Continue Keflex 500 mg Po BID,and Plavix 75 mg PO.   Patient Ultrasound ordered in 's office of BLE pending.  Rx for Getamicin ointment given to patient today in clinic.                Plan:                3 wks 1/8/20 Dr. Mariano

## 2020-01-08 ENCOUNTER — HOSPITAL ENCOUNTER (OUTPATIENT)
Dept: WOUND CARE | Facility: HOSPITAL | Age: 80
Discharge: HOME | End: 2020-01-08
Attending: SURGERY
Payer: MEDICARE

## 2020-01-08 ENCOUNTER — HOSPITAL ENCOUNTER (OUTPATIENT)
Dept: RADIOLOGY | Facility: HOSPITAL | Age: 80
Discharge: HOME | End: 2020-01-08
Attending: INTERNAL MEDICINE
Payer: MEDICARE

## 2020-01-08 VITALS
TEMPERATURE: 97 F | DIASTOLIC BLOOD PRESSURE: 55 MMHG | BODY MASS INDEX: 37.04 KG/M2 | WEIGHT: 236 LBS | HEIGHT: 67 IN | HEART RATE: 58 BPM | SYSTOLIC BLOOD PRESSURE: 120 MMHG

## 2020-01-08 DIAGNOSIS — I87.2 VENOUS STASIS DERMATITIS OF BOTH LOWER EXTREMITIES: Primary | ICD-10-CM

## 2020-01-08 DIAGNOSIS — I87.2 VENOUS STASIS DERMATITIS OF BOTH LOWER EXTREMITIES: ICD-10-CM

## 2020-01-08 DIAGNOSIS — I10 ESSENTIAL HYPERTENSION: ICD-10-CM

## 2020-01-08 DIAGNOSIS — E08.01 DIABETES MELLITUS DUE TO UNDERLYING CONDITION WITH HYPEROSMOLARITY AND COMA, WITH LONG-TERM CURRENT USE OF INSULIN: ICD-10-CM

## 2020-01-08 DIAGNOSIS — Z79.4 DIABETES MELLITUS DUE TO UNDERLYING CONDITION WITH HYPEROSMOLARITY AND COMA, WITH LONG-TERM CURRENT USE OF INSULIN: ICD-10-CM

## 2020-01-08 DIAGNOSIS — I87.2 VENOUS INSUFFICIENCY OF BOTH LOWER EXTREMITIES: ICD-10-CM

## 2020-01-08 DIAGNOSIS — I73.9 PAD (PERIPHERAL ARTERY DISEASE): ICD-10-CM

## 2020-01-08 PROCEDURE — 93970 EXTREMITY STUDY: CPT | Mod: TC

## 2020-01-08 PROCEDURE — 99212 OFFICE O/P EST SF 10 MIN: CPT

## 2020-01-08 PROCEDURE — 93970 US LOWER EXTREMITY VEINS BILATERAL INSUFFICIENCY: ICD-10-PCS | Mod: 26,,, | Performed by: RADIOLOGY

## 2020-01-08 PROCEDURE — 93970 EXTREMITY STUDY: CPT | Mod: 26,,, | Performed by: RADIOLOGY

## 2020-01-08 NOTE — PROGRESS NOTES
"Subjective:       Patient ID: Ray Menchaca is a 79 y.o. male.    Chief Complaint: Venous Stasis (right leg)    12/11/19/:79 yr old Danish speaking  male, admit today to wound care clinic for . Patient has hx of CABG, DM type 2, PAD.  Per patient his current wound to right leg has been present " about a week." He reports "he hits his leg off and on it swells and blisters and takes a while to heal." In January 2019 patient underwent a right leg angiogram in [place], but it was unsuccessful opening up the blockages.  Patient seen  last month early November for his legs in office, he reports labs and ultrasounds were done. Dr. Mariano seen patient, wound culture taken today in clinic, RX given to patient for Keflex 500 mg po, and Plavix 75 mg po daily.  Patient to see Dr. Kathleen in office 12/16/18.  Family requesting home health for dressing change. Orders given to apply hydorfera ready, small abd pad, conform and tubi  F toes to knee. Follow up 1 week 12/18/19 in clinic with . Haleigh in Cardiology interpreted today's visit.   12/18/19: Haleigh in cardiology interpreted at visit today.  Seen today in clinic by Dr. Mariano. Wound improving. Patient complains of hydrofera ready sticking to wound.  Patient states home health came out last Thursday, but he can not remember the name of the company for orders to be sent to, copy of new orders given to patient for home health.  Patient also reports he had an appointment with Dr. Kathleen Monday 12/16/18, and  ordered another Ultrasound of BLE to check the blood flow.  Number for scheduling was given to patient.  Orders changed to gentamicin and adaptic to wound, small abd pad, conform and tubi .  Follow up in 3 weeks in clinic with .  1/8/20: F/U with Dr. Mariano. Right leg resolved today. Discharged from clinic. Return as needed.    Review of Systems   Constitutional: Negative.    HENT: Negative.    Eyes: Negative.  "   Respiratory: Negative.    Cardiovascular: Negative.    Gastrointestinal: Negative.    Genitourinary: Negative.    Musculoskeletal: Negative.    Neurological: Negative.    Psychiatric/Behavioral: Negative.        Objective:      Physical Exam   Constitutional: He is oriented to person, place, and time. He appears well-developed and well-nourished.   HENT:   Head: Normocephalic.   Eyes: Pupils are equal, round, and reactive to light. Conjunctivae and EOM are normal.   Neck: Normal range of motion. Neck supple.   Cardiovascular: Normal rate, regular rhythm, normal heart sounds and intact distal pulses.   Pulmonary/Chest: Effort normal and breath sounds normal.   Abdominal: Soft. Bowel sounds are normal.   Musculoskeletal: Normal range of motion.   Neurological: He is alert and oriented to person, place, and time. He has normal reflexes.   Skin: Skin is warm and dry.       Assessment:       No diagnosis found.       Wound 12/11/19 0920 Venous Ulcer lower Leg #1 (Active)   12/11/19 0920    Pre-existing: Yes   Primary Wound Type: Venous ulcer   Side: Right   Orientation: lower   Location: Leg   Wound/PI Number (optional): #1   Ankle-Brachial Index:    Pulses: +Doppler   Removal Indication and Assessment:    Wound Outcome:    (Retired) Wound Type:    (Retired) Wound Length (cm):    (Retired) Wound Width (cm):    (Retired) Depth (cm):    Wound Description (Comments):    Removal Indications:    Wound Image   1/8/2020 10:09 AM   Dressing Appearance Open to air 1/8/2020 10:09 AM   Drainage Amount None 1/8/2020 10:09 AM   Appearance Red 1/8/2020 10:09 AM   Tissue loss description Not applicable 1/8/2020 10:09 AM   Red (%), Wound Tissue Color 100 % 1/8/2020 10:09 AM   Periwound Area Hemosiderin Staining 1/8/2020 10:09 AM   Wound Edges Rolled/closed 1/8/2020 10:09 AM   Wound Length (cm) 0 cm 1/8/2020 10:09 AM   Wound Width (cm) 0 cm 1/8/2020 10:09 AM   Wound Depth (cm) 0 cm 1/8/2020 10:09 AM   Wound Volume (cm^3) 0 cm^3  1/8/2020 10:09 AM   Wound Surface Area (cm^2) 0 cm^2 1/8/2020 10:09 AM   Care Cleansed with:;Soap and water 1/8/2020 10:09 AM       Right lower leg resolved.                  Plan:                Return as needed.    Doing well discharged

## 2020-01-09 ENCOUNTER — HOSPITAL ENCOUNTER (OUTPATIENT)
Facility: HOSPITAL | Age: 80
Discharge: HOME | End: 2020-01-09
Attending: INTERNAL MEDICINE | Admitting: INTERNAL MEDICINE
Payer: MEDICARE

## 2020-01-09 VITALS
BODY MASS INDEX: 36.88 KG/M2 | TEMPERATURE: 98 F | HEIGHT: 67 IN | RESPIRATION RATE: 20 BRPM | OXYGEN SATURATION: 96 % | HEART RATE: 57 BPM | SYSTOLIC BLOOD PRESSURE: 131 MMHG | WEIGHT: 235 LBS | DIASTOLIC BLOOD PRESSURE: 66 MMHG

## 2020-01-09 DIAGNOSIS — I87.2 VENOUS INSUFFICIENCY OF BOTH LOWER EXTREMITIES: ICD-10-CM

## 2020-01-09 DIAGNOSIS — I87.2 VENOUS STASIS DERMATITIS OF BOTH LOWER EXTREMITIES: ICD-10-CM

## 2020-01-09 PROCEDURE — 36478 ENDOVENOUS LASER 1ST VEIN: CPT | Mod: RT,,, | Performed by: INTERNAL MEDICINE

## 2020-01-09 PROCEDURE — 36478 ENDOVENOUS LASER 1ST VEIN: CPT | Mod: RT | Performed by: INTERNAL MEDICINE

## 2020-01-09 PROCEDURE — 63600175 PHARM REV CODE 636 W HCPCS: Performed by: INTERNAL MEDICINE

## 2020-01-09 PROCEDURE — 25000003 PHARM REV CODE 250: Performed by: INTERNAL MEDICINE

## 2020-01-09 PROCEDURE — 36478 PR ENDOVENOUS LASER, 1ST VEIN: ICD-10-PCS | Mod: RT,,, | Performed by: INTERNAL MEDICINE

## 2020-01-09 PROCEDURE — 27201423 OPTIME MED/SURG SUP & DEVICES STERILE SUPPLY: Performed by: INTERNAL MEDICINE

## 2020-01-09 RX ORDER — HYDROCODONE BITARTRATE AND ACETAMINOPHEN 7.5; 325 MG/1; MG/1
TABLET ORAL
Status: DISCONTINUED | OUTPATIENT
Start: 2020-01-09 | End: 2020-01-09 | Stop reason: HOSPADM

## 2020-01-09 RX ORDER — LIDOCAINE HYDROCHLORIDE 10 MG/ML
20 INJECTION, SOLUTION EPIDURAL; INFILTRATION; INTRACAUDAL; PERINEURAL ONCE
Status: DISCONTINUED | OUTPATIENT
Start: 2020-01-09 | End: 2020-01-09 | Stop reason: HOSPADM

## 2020-01-09 RX ORDER — DIAZEPAM 5 MG/1
TABLET ORAL
Status: DISCONTINUED | OUTPATIENT
Start: 2020-01-09 | End: 2020-01-09 | Stop reason: HOSPADM

## 2020-01-09 RX ORDER — DIAZEPAM 5 MG/1
10 TABLET ORAL ONCE
Status: DISCONTINUED | OUTPATIENT
Start: 2020-01-09 | End: 2020-01-09 | Stop reason: HOSPADM

## 2020-01-09 RX ORDER — HYDROCODONE BITARTRATE AND ACETAMINOPHEN 7.5; 325 MG/1; MG/1
1 TABLET ORAL EVERY 6 HOURS PRN
Status: DISCONTINUED | OUTPATIENT
Start: 2020-01-09 | End: 2020-01-09 | Stop reason: HOSPADM

## 2020-01-09 NOTE — BRIEF OP NOTE
S/p R GSV EVLT for venous stasis      He tolerated the procedure well      Full note to follow      Thanks      ZN

## 2020-01-09 NOTE — DISCHARGE INSTRUCTIONS
Recovering at home  Once at home, follow all the instructions youve been given. Be sure to:  · Take all medicines as directed  · Care for the catheter insertion site as directed  · Check for signs of infection at the catheter insertion site: check for warmth, redness, yellow/green discharge from access site  · Wear elastic stockings or bandages as directed: first 3 days wear stockings day and night. After the first 3 initials days, wear stocking during the day only for 21 days.  · Keep your legs raised (elevated) as directed  · Walk a few times a day. Walk for at least 5 minutes every hours, while awake.  · Avoid heavy exercise, lifting, and standing for long periods as advised  · Avoid air travel, hot baths, saunas, or whirlpools for the next 3 weeks.    La recuperación en el hogar  Melba vez en casa, siga todas las instrucciones que le hayan dado. Asegúrese de hacer lo siguiente:  · [place] todos samantha medicamentos de la forma indicada.  · Cuídese el sitio de inserción del catéter según las indicaciones.  · Revise el sitio de inserción del catéter para marco si tiene señales de infección (med más adelante).  · Póngase medias o vendas elásticas según las indicaciones.  · Mantenga las piernas elevadas según las indicaciones.  · [place] unas cuantas veces al día.  · Evite hacer ejercicios enérgicos, levantar cargas o pasar demasiado tiempo de pie según las indicaciones.  · Evite los viajes en avión, los [place] calientes, las saunas y las tinas de hidromasaje (jacuzzis) según las indicaciones

## 2020-01-09 NOTE — INTERVAL H&P NOTE
The patient has been examined and the H&P has been reviewed:        Anesthesia/Surgery risks, benefits and alternative options discussed and understood by patient/family.          Active Hospital Problems    Diagnosis  POA    DM (diabetes mellitus) [E11.9]  Yes    Venous stasis dermatitis of both lower extremities [I87.2]  Yes    S/P CABG (coronary artery bypass graft) [Z95.1]  Not Applicable    Coronary artery disease involving native coronary artery of native heart without angina pectoris [I25.10]  Yes    Essential hypertension [I10]  Yes    PAD (peripheral artery disease) [I73.9]  Yes    Pure hypercholesterolemia [E78.00]  Yes      Resolved Hospital Problems   No resolved problems to display.           He is here for R GSV EVLT  He has reflux > 500 msec at the junction of R GSV and CFV        Thanks        ZN

## 2020-01-09 NOTE — NURSING
EVLT of right leg completed. Pt tolerated well,. Pt ready for discharge as ordered per Md novak, discharge instructions reviewed w pt and spouse, both verbalized understanding, paper copy provided. Pt left unit via w/c escorted by spouse and PCT JACKELINE Peraza

## 2020-02-11 ENCOUNTER — DOCUMENTATION ONLY (OUTPATIENT)
Dept: CARDIOLOGY | Facility: HOSPITAL | Age: 80
End: 2020-02-11

## 2020-02-11 NOTE — PROGRESS NOTES
This note has been moved to another encounter. If you have any questions, please contact HIM Chart Correction at [phone].

## 2020-02-11 NOTE — PROGRESS NOTES
Discharge summary 1/9/2020      Russel Kathleen  1/9/2020  LOS: dc same day            The patient was prepped and draped in the usual sterile fashion. Using a 25 gauge needle, the entry site was anesthetized with 1% Lidocaine. Under ultrasound guidance, access to the right greater saphenous vein was obtained using a Microset 21 gauge needle.    A microwire was subsequently inserted and the Microset needle was removed. A Microset dilator was inserted over the microwire and was removed. A 0.035 J type guide wire was inserted through the Microset dilator and threaded through the saphenous vein to the saphenofemoral junction (SFJ) and a 5 Fr. Introducer sheath was inserted over the J wire until the end of the sheath entered the SFJ.     The dilator and J wire were removed and a 600 micron laser fiber was introduced and placed 38 cm distal to the saphenofemoral junction.    The final position of the fiber was determined by ultrasound guidance and duplex imaging. 500cc Tumescence anesthetic (2% Lidocaine incc normal saline) was delivered by ultrasound guidance throughout the course of the greater saphenous vein without difficulty. A final position check was made by ultrasound.    Total laser length was 38 cm. The laser was activated by foot pedal control and the fiber and sheath were withdrawn at a rate of 1 cm/sec over the entire length of the treated vein.     The total time of energy delivery was 472 seconds.    The total energy delivered was 6255 joules.    Repeat duplex imaging showed no flow over the entire length of the treated vein, and the SFJ was patent. After assuring hemostasis, the skin incision over the greater saphenous vein was covered with a bandage.        Patient's Medications   New Prescriptions    No medications on file   Previous Medications    ASPIRIN (ECOTRIN) 81 MG EC TABLET    Take 81 mg by mouth once daily.    ATORVASTATIN (LIPITOR) 40 MG TABLET    Take 40 mg by mouth once daily.      CEPHALEXIN (KEFLEX) 500 MG CAPSULE    Take 500 mg by mouth 2 (two) times daily.    CLOPIDOGREL (PLAVIX) 75 MG TABLET    TAKE 1 TABLET BY MOUTH EVERY DAY    DULOXETINE (CYMBALTA) 30 MG CAPSULE    TAKE 1 CAPSULE(S) EVERY DAY BY ORAL ROUTE.    GABAPENTIN (NEURONTIN) 300 MG CAPSULE    300 mg 3 (three) times daily.     HYDROCHLOROTHIAZIDE (MICROZIDE) 12.5 MG CAPSULE    Take 12.5 mg by mouth once daily.     INSULIN LISPRO (HUMALOG KWIKPEN INSULIN) 100 UNIT/ML PEN    Inject 13 units 3 times a day by subcutaneous route.    ISOSORBIDE MONONITRATE (IMDUR) 60 MG 24 HR TABLET    Take 60 mg by mouth once daily.     LISINOPRIL (PRINIVIL,ZESTRIL) 40 MG TABLET    Take 40 mg by mouth once daily.     METOPROLOL TARTRATE (LOPRESSOR) 50 MG TABLET    Take 50 mg by mouth 2 (two) times daily.     TAMSULOSIN (FLOMAX) 0.4 MG CAP    0.4 mg once daily.    Modified Medications    No medications on file   Discontinued Medications    No medications on file            DC home      Follow up with PCP      Follow up with Dr. Kathleen or primary cardiologist as scheduled      Cardiac diet      Resume normal activities in 3 days        No consult

## 2020-03-16 ENCOUNTER — TELEPHONE (OUTPATIENT)
Dept: CARDIOLOGY | Facility: CLINIC | Age: 80
End: 2020-03-16

## 2020-03-16 NOTE — TELEPHONE ENCOUNTER
Tried reaching out to pt multiple times in regards to rescheduling pt clinical abdulkadir tomorrow 3/17 due to COVID-19 concerns     Unable to get in contact w/ pt, VM box not set up     Will try reaching out to pt again later

## 2020-03-17 ENCOUNTER — OFFICE VISIT (OUTPATIENT)
Dept: CARDIOLOGY | Facility: CLINIC | Age: 80
End: 2020-03-17
Payer: MEDICARE

## 2020-03-17 VITALS
DIASTOLIC BLOOD PRESSURE: 60 MMHG | OXYGEN SATURATION: 94 % | BODY MASS INDEX: 38.53 KG/M2 | SYSTOLIC BLOOD PRESSURE: 126 MMHG | HEART RATE: 86 BPM | HEIGHT: 67 IN | WEIGHT: 245.5 LBS

## 2020-03-17 DIAGNOSIS — I73.9 PAD (PERIPHERAL ARTERY DISEASE): ICD-10-CM

## 2020-03-17 DIAGNOSIS — E78.00 PURE HYPERCHOLESTEROLEMIA: ICD-10-CM

## 2020-03-17 DIAGNOSIS — E08.01 DIABETES MELLITUS DUE TO UNDERLYING CONDITION WITH HYPEROSMOLARITY AND COMA, WITH LONG-TERM CURRENT USE OF INSULIN: ICD-10-CM

## 2020-03-17 DIAGNOSIS — I87.2 VENOUS INSUFFICIENCY: ICD-10-CM

## 2020-03-17 DIAGNOSIS — I87.2 VENOUS STASIS DERMATITIS OF BOTH LOWER EXTREMITIES: ICD-10-CM

## 2020-03-17 DIAGNOSIS — Z79.4 DIABETES MELLITUS DUE TO UNDERLYING CONDITION WITH HYPEROSMOLARITY AND COMA, WITH LONG-TERM CURRENT USE OF INSULIN: ICD-10-CM

## 2020-03-17 DIAGNOSIS — I10 ESSENTIAL HYPERTENSION: ICD-10-CM

## 2020-03-17 DIAGNOSIS — I25.10 CORONARY ARTERY DISEASE INVOLVING NATIVE CORONARY ARTERY OF NATIVE HEART WITHOUT ANGINA PECTORIS: ICD-10-CM

## 2020-03-17 DIAGNOSIS — Z95.1 S/P CABG (CORONARY ARTERY BYPASS GRAFT): ICD-10-CM

## 2020-03-17 DIAGNOSIS — I48.0 PAROXYSMAL ATRIAL FIBRILLATION: Primary | ICD-10-CM

## 2020-03-17 PROCEDURE — 3074F SYST BP LT 130 MM HG: CPT | Mod: CPTII,S$GLB,, | Performed by: INTERNAL MEDICINE

## 2020-03-17 PROCEDURE — 93000 EKG 12-LEAD: ICD-10-PCS | Mod: S$GLB,,, | Performed by: INTERNAL MEDICINE

## 2020-03-17 PROCEDURE — 1159F MED LIST DOCD IN RCRD: CPT | Mod: S$GLB,,, | Performed by: INTERNAL MEDICINE

## 2020-03-17 PROCEDURE — 3078F DIAST BP <80 MM HG: CPT | Mod: CPTII,S$GLB,, | Performed by: INTERNAL MEDICINE

## 2020-03-17 PROCEDURE — 3074F PR MOST RECENT SYSTOLIC BLOOD PRESSURE < 130 MM HG: ICD-10-PCS | Mod: CPTII,S$GLB,, | Performed by: INTERNAL MEDICINE

## 2020-03-17 PROCEDURE — 99499 UNLISTED E&M SERVICE: CPT | Mod: S$GLB,,, | Performed by: INTERNAL MEDICINE

## 2020-03-17 PROCEDURE — 1126F PR PAIN SEVERITY QUANTIFIED, NO PAIN PRESENT: ICD-10-PCS | Mod: S$GLB,,, | Performed by: INTERNAL MEDICINE

## 2020-03-17 PROCEDURE — 1101F PR PT FALLS ASSESS DOC 0-1 FALLS W/OUT INJ PAST YR: ICD-10-PCS | Mod: CPTII,S$GLB,, | Performed by: INTERNAL MEDICINE

## 2020-03-17 PROCEDURE — 93000 ELECTROCARDIOGRAM COMPLETE: CPT | Mod: S$GLB,,, | Performed by: INTERNAL MEDICINE

## 2020-03-17 PROCEDURE — 3078F PR MOST RECENT DIASTOLIC BLOOD PRESSURE < 80 MM HG: ICD-10-PCS | Mod: CPTII,S$GLB,, | Performed by: INTERNAL MEDICINE

## 2020-03-17 PROCEDURE — 1159F PR MEDICATION LIST DOCUMENTED IN MEDICAL RECORD: ICD-10-PCS | Mod: S$GLB,,, | Performed by: INTERNAL MEDICINE

## 2020-03-17 PROCEDURE — 99499 RISK ADDL DX/OHS AUDIT: ICD-10-PCS | Mod: S$GLB,,, | Performed by: INTERNAL MEDICINE

## 2020-03-17 PROCEDURE — 1101F PT FALLS ASSESS-DOCD LE1/YR: CPT | Mod: CPTII,S$GLB,, | Performed by: INTERNAL MEDICINE

## 2020-03-17 PROCEDURE — 99214 OFFICE O/P EST MOD 30 MIN: CPT | Mod: S$GLB,,, | Performed by: INTERNAL MEDICINE

## 2020-03-17 PROCEDURE — 99214 PR OFFICE/OUTPT VISIT, EST, LEVL IV, 30-39 MIN: ICD-10-PCS | Mod: S$GLB,,, | Performed by: INTERNAL MEDICINE

## 2020-03-17 PROCEDURE — 99999 PR PBB SHADOW E&M-EST. PATIENT-LVL III: CPT | Mod: PBBFAC,,, | Performed by: INTERNAL MEDICINE

## 2020-03-17 PROCEDURE — 99999 PR PBB SHADOW E&M-EST. PATIENT-LVL III: ICD-10-PCS | Mod: PBBFAC,,, | Performed by: INTERNAL MEDICINE

## 2020-03-17 PROCEDURE — 1126F AMNT PAIN NOTED NONE PRSNT: CPT | Mod: S$GLB,,, | Performed by: INTERNAL MEDICINE

## 2020-03-17 NOTE — PROGRESS NOTES
Patient, Ray Menchaca (MRN #[id number]), presented with a recorded BMI of 38.44 kg/m^2 and a documented comorbidity(s):  - Hypertension  - Hyperlipidemia  - Atrial Fibrillation  to which the severe obesity is a contributing factor. This is consistent with the definition of severe obesity (BMI 35.0-39.9) with comorbidity (ICD-10 E66.01, Z68.35). The patient's severe obesity was monitored, evaluated, addressed and/or treated. This addendum to the medical record is made on 03/17/2020.

## 2020-03-17 NOTE — PROGRESS NOTES
Subjective:    Patient ID:  Ray Menchaca is a 80 y.o. male who presents for follow-up of Coronary Artery Disease      HPI    80 y/o [place]an speaking male with hx of CAD s/p 3V CABG 2005, HTN, HLD, DM, PAD, venous stasis dermatitis, venous insufficiency s/p EVLT to [facility] (Dr Kathleen), who presents for f/u. Previously seen by Dr Zimmerman and Dr Kathleen. Denies CP, SOB/REYNAGA, orthopnea, PND, syncope, palps. Has chronic bilateral LE edema and previous wounds on anterior shin. Wounds healed and RLE edema significantly improved (s/p EVLT). Had ECG suspicious for aflutter. ECG in clinic today with afib with controlled V-response. Has not obtained Holter. Follows with Wound Care clinic. Has 2DE with normal EF, DD, PHTN. Has arterial doppler with patent flow and elevated velocities BTK. Has venous doppler without DVT.     Review of Systems   Constitution: Negative for malaise/fatigue.   HENT: Negative for congestion.    Eyes: Negative for blurred vision.   Cardiovascular: Positive for dyspnea on exertion and leg swelling. Negative for chest pain, claudication, cyanosis, irregular heartbeat, near-syncope, orthopnea, palpitations, paroxysmal nocturnal dyspnea and syncope.   Respiratory: Negative for shortness of breath.    Endocrine: Negative for polyuria.   Hematologic/Lymphatic: Negative for bleeding problem.   Skin: Negative for itching and rash.   Musculoskeletal: Positive for arthritis and joint pain. Negative for joint swelling, muscle cramps and muscle weakness.   Gastrointestinal: Negative for abdominal pain, hematemesis, hematochezia, melena, nausea and vomiting.   Genitourinary: Negative for dysuria and hematuria.   Neurological: Negative for dizziness, focal weakness, headaches, light-headedness, loss of balance and weakness.   Psychiatric/Behavioral: Negative for depression. The patient is not nervous/anxious.         Objective:    Physical Exam   Constitutional: He is oriented to person, place, and time. He  appears well-developed and well-nourished.   HENT:   Head: Normocephalic and atraumatic.   Neck: Neck supple. No JVD present.   Cardiovascular: Normal rate and normal heart sounds. An irregularly irregular rhythm present.   Pulses:       Carotid pulses are 2+ on the right side, and 2+ on the left side.       Radial pulses are 2+ on the right side, and 2+ on the left side.        Femoral pulses are 2+ on the right side, and 2+ on the left side.       Posterior tibial pulses are 1+ on the right side, and 1+ on the left side.   Pulmonary/Chest: Effort normal and breath sounds normal.   Abdominal: Soft. Bowel sounds are normal.   Musculoskeletal: He exhibits edema.   Neurological: He is alert and oriented to person, place, and time.   Skin: Skin is warm and dry.   Psychiatric: He has a normal mood and affect. His behavior is normal. Thought content normal.         Assessment:       1. Paroxysmal atrial fibrillation    2. Coronary artery disease involving native coronary artery of native heart without angina pectoris    3. Essential hypertension    4. S/P CABG (coronary artery bypass graft)    5. Venous stasis dermatitis of both lower extremities    6. PAD (peripheral artery disease)    7. Pure hypercholesterolemia    8. Diabetes mellitus due to underlying condition with hyperosmolarity and coma, with long-term current use of insulin    9. Venous insufficiency      81 y/o pt with hx and presentation as above. Doing well from a cardiac perspective and compensated from a HF perspective. LE edema improved post EVLT. Has newly diagnosed afib with elevated CHADSVasc of 5 (HTN, Age x 2, DM, vasc), therefore AC indicated for CVA prevention. Will start DOAC. Discussed afib and DOAC at length with pt and daughter. Already on BB, rate controlled, asymptomatic, and will pursue a rate control strategy for now to avoid exposure in hospital setting to COVID (I.e. TEEE/DCCV). Discussed the etiology, evaluation, and management of Afib,  DOAC, CVA, CAD, CABG, PAD, HLD, DM, venous insuff. Discussed the importance of med compliance, heart healthy diet, and regular exercise.        Plan:       -Start Xarelto 15 mg daily (CKD)  -Continue current medical management  -f/u in 3 months

## (undated) DEVICE — COVER BAND BAG 40 X 40

## (undated) DEVICE — PACK COMPREHENSIVE PROCEDURE

## (undated) DEVICE — KIT STANDARD PROCEDURE 4F 55CM

## (undated) DEVICE — COVER PROBE US 5.5X58L NON LTX